# Patient Record
Sex: FEMALE | Race: WHITE | Employment: OTHER | URBAN - METROPOLITAN AREA
[De-identification: names, ages, dates, MRNs, and addresses within clinical notes are randomized per-mention and may not be internally consistent; named-entity substitution may affect disease eponyms.]

---

## 2018-08-23 ENCOUNTER — THERAPY VISIT (OUTPATIENT)
Dept: PHYSICAL THERAPY | Facility: CLINIC | Age: 48
End: 2018-08-23
Payer: COMMERCIAL

## 2018-08-23 DIAGNOSIS — M25.571 PAIN IN JOINT, ANKLE AND FOOT, RIGHT: Primary | ICD-10-CM

## 2018-08-23 PROCEDURE — 97110 THERAPEUTIC EXERCISES: CPT | Mod: GP | Performed by: PHYSICAL THERAPIST

## 2018-08-23 PROCEDURE — 97161 PT EVAL LOW COMPLEX 20 MIN: CPT | Mod: GP | Performed by: PHYSICAL THERAPIST

## 2018-08-23 PROCEDURE — 97112 NEUROMUSCULAR REEDUCATION: CPT | Mod: GP | Performed by: PHYSICAL THERAPIST

## 2018-08-23 NOTE — LETTER
[facility]  [address]  [facility] [id number] [phone]    2018    Re: Rubina Jordan   :   1970  MRN:  [id number]   REFERRING PHYSICIAN:   Angel Valdovinos    [facility]    DISCHARGE REPORT  Progress reporting period is from 2018      SUBJECTIVE  Subjective changes noted by patient:  Unknown as patient was only seen for her initial visit    Current pain level is NA.     Previous pain level was   Initial Pain level: 2/10.   Changes in function:  unknown  Adverse reaction to treatment or activity: None    OBJECTIVE  Changes noted in objective findings:  Patient has failed to return to therapy so current objective findings are unknown.     ASSESSMENT/PLAN  Updated problem list and treatment plan: Diagnosis 1:  Right ankle pain  Pain -  self management, education and home program  Decreased strength - therapeutic exercise, therapeutic activities and home program  Decreased proprioception - neuro re-education, therapeutic activities and home program  Decreased function - therapeutic activities and home program  STG/LTGs have been met or progress has been made towards goals:  unknown  Assessment of Progress: The patient's condition has potential to improve.  The patient has not returned to therapy. Current status is unknown.  Self Management Plans:  Patient has been instructed in a home treatment program.    Rubina continues to require the following intervention to meet STG and LTG's:  Patient needs to continue to work on the home exercise program.    Recommendations:  Will discharge since patient has not returned.    Thank you for your referral.    INQUIRIES  Therapist: Brinda Drake PT, [facility]  [address]  [place] [facility] [address]  Phone: [phone]  Fax: [phone]

## 2018-08-23 NOTE — MR AVS SNAPSHOT
"              After Visit Summary   2018    Rubina Jordan    MRN: [id number]           Patient Information     Date Of Birth          1970        Visit Information        Provider Department      2018 5:50 PM Brinda Drake PT [facility] Physical Therapy        Today's Diagnoses     Pain in joint, ankle and foot, right    -  1       Follow-ups after your visit        Who to contact     If you have questions or need follow up information about today's clinic visit or your schedule please contact [facility] PHYSICAL [facility] directly at [phone].  Normal or non-critical lab and imaging results will be communicated to you by [employer], letter or phone within 4 business days after the clinic has received the results. If you do not hear from us within 7 days, please contact the clinic through [employer] or phone. If you have a critical or abnormal lab result, we will notify you by phone as soon as possible.  Submit refill requests through [employer] or call your pharmacy and they will forward the refill request to us. Please allow 3 business days for your refill to be completed.          Additional Information About Your Visit        MyChart Information     [facility] [facility] lets you send messages to your doctor, view your test results, renew your prescriptions, schedule appointments and more. To sign up, go to [email or web address] . Click on \"Log in\" on the left side of the screen, which will take you to the Welcome page. Then click on \"Sign up Now\" on the right side of the page.     You will be asked to enter the access code listed below, as well as some personal information. Please follow the directions to create your username and password.     Your access code is: [email or web address]  Expires: 2018  5:44 PM     Your access code will  in 90 days. If you need help or a new code, please call your [place] clinic or [phone].        Care " EveryWhere ID     This is your Care EveryWhere ID. This could be used by other organizations to access your [place] medical records  [id number]         Blood Pressure from Last 3 Encounters:   No data found for BP    Weight from Last 3 Encounters:   No data found for Wt              We Performed the Following     HC PT EVAL, LOW COMPLEXITY     [facility] INITIAL EVAL REPORT     NEUROMUSCULAR RE-EDUCATION     THERAPEUTIC EXERCISES        Primary Care Provider Office Phone # Fax #    Angel Valdovinos [phone]       [place] [address]        Equal Access to Services     [facility]: Hadii aad ku hadasho Soomaali, waaxda luqadaha, qaybta kaalmada adeegyada, waxay idiin hayaan adepriscilla garg . So [facility] [phone].    ATENCIÓN: Si habla español, tiene a lackey disposición servicios gratuitos de asistencia lingüística. Ll[facility] [phone].    We comply with applicable federal civil rights laws and [place] laws. We do not discriminate on the basis of race, color, national origin, age, disability, sex, sexual orientation, or gender identity.            Thank you!     Thank you for choosing [facility] PH[facility]ICAL THERAP[facility]  for your care. Our goal is always to provide you with excellent care. Hearing back from our patients is one way we can continue to improve our services. Please take a few minutes to complete the written survey that you may receive in the mail after your visit with us. Thank you!             Your Updated Medication List - Protect others around you: Learn how to safely use, store and throw away your medicines at www.disposemymeds.org.      Notice  As of 8/23/2018  6:50 PM    You have not been prescribed any medications.

## 2018-08-23 NOTE — LETTER
[facility]  [address]  [facility] [id number] [phone]    2018    Re: Rubina Jordan   :   1970  MRN:  [id number]   REFERRING PHYSICIAN:   Angel Valdovinos    [facility]    Date of Initial Evaluation:  2018  Visits:  Rxs Used: 1  Reason for Referral:  Pain in joint, ankle and foot, right    EVALUATION SUMMARY    [facility]  Subjective:  Patient is a 48 year old female presenting with rehab right ankle/foot hpi. The history is provided by the patient. No  was used.   Rubina Jordan is a 48 year old female with a right ankle condition.  Condition occurred with:  Running.  Condition occurred: in the community.  This is a new condition  Found out she got into the [employer] 10 mile race and decided she should try to run.  Had not been running up until this point.  Tweaked right ankle running down a hill one month ago.  Has had previous ankle sprains. Stopped running and rested with biking, kayaking, and other activities.  Then tried to run yesterday with her brace and went 1.5 blocks and had pain with this.  Gave entry to the race to her sister.  Would like to run again.  Wears heels a few times a week at work.  Also has some right shoulder pain and does weight training 2 times a week at a class.  Has to be mindful of what she is doing now.  .    Patient reports pain:  Lower leg.  Radiates to:  Ankle and lower leg.  Pain is described as aching and is intermittent and reported as 2/10.  Associated with: none. Pain is worse during the day.  Symptoms are exacerbated by nothing and relieved by rest.  Since onset symptoms are gradually improving.                        Red flags:  None as reported by patient.    Objective:  Gait:    Deviations:  General Deviations:  Toe out L and toe out R  Ankle/Foot Evaluation  ROM:  AROM is  normal.PROM is normal.  Strength:    Dorsiflexion:  Left: 5/5   Strong/pain free    Pain:   Right: 5/5   Strong/pain free  Pain:  Plantarflexion: Left: 5/5   Strong/pain free  Pain:   Right: 5/5  Strong/pain free  Pain:  Inversion:Left: 5/5  Strong/pain free  Pain:     Right: 5/5  Strong/pain free  Pain:  Eversion:Left: 5/5  Strong/pain free  Pain:  Right: 5/5  Strong/pain free  Pain:  Strength wnl ankle: limited pf with single leg toe raise on the left.  LIGAMENT TESTING:   Anterior Drawer (ATF) Right: pos  Varus Stress (Calc Fib) Right: pos  External Rotation (High Ankle) Right: pos  SPECIAL TESTS: normal  PALPATION:   Right ankle tenderness present at:   anterior talofibular ligament and anterior tibiofibular ligament  EDEMA: Edema ankle: slight, anterior ankle.  MOBILITY TESTING:   Talocrural Right: hypermobile  Subtalar Left: hypermobile    Subtalar Right: hypermobile  FUNCTIONAL TESTS:   Quad:  Bilateral Leg Squat: anterior ankle adriana   Control is mild loss of control  Proprioception:  Stork Balance Test: Left: 30 sec  Right: 30sec, difficult on Airex pad             Running Evaluation:  Shoe Wear:    Type: Saucony    Orthotics: superfeet only in running shoes    Assessment/Plan:    Patient is a 48 year old female with right side ankle complaints.    Patient has the following significant findings with corresponding treatment plan.                Diagnosis 1:  Right ankle pain  Pain -  manual therapy, splint/taping/bracing/orthotics, self management, education and home program  Decreased strength - therapeutic exercise, therapeutic activities and home program  Decreased proprioception - neuro re-education, therapeutic activities and home program  Inflammation - self management/home program  Decreased function - therapeutic activities and home program    Therapy Evaluation Codes:   1) History comprised of:   Personal factors that impact the plan of care:      Time since onset of symptoms.    Comorbidity factors  that impact the plan of care are:      None.     Medications impacting care: None.  2) Examination of Body Systems comprised of:   Body structures and functions that impact the plan of care:      Ankle.   Activity limitations that impact the plan of care are:      Running and Walking.  3) Clinical presentation characteristics are:   Stable/Uncomplicated.  4) Decision-Making    Low complexity using standardized patient assessment instrument and/or measureable assessment of functional outcome.  Cumulative Therapy Evaluation is: Low complexity.  Previous and current functional limitations:  (See Goal Flow Sheet for this information)    Short term and Long term goals: (See Goal Flow Sheet for this information)   Communication ability:  Patient appears to be able to clearly communicate and understand verbal and written communication and follow directions correctly.  Treatment Explanation - The following has been discussed with the patient:   RX ordered/plan of care  Anticipated outcomes  Possible risks and side effects  This patient would benefit from PT intervention to resume normal activities.   Rehab potential is excellent.  Frequency:  1 X week, once daily  Duration:  for 12 weeks  Discharge Plan:  Achieve all LTG.  Independent in home treatment program.  Reach maximal therapeutic benefit.      Thank you for your referral.    INQUIRIES  Therapist: Brinda Drake, PT    [facility]  [address] [place], [place] [address] [address]  [facility] [address]  Phone: [phone]  Fax: [phone]

## 2018-08-23 NOTE — PROGRESS NOTES
[facility] Initial Evaluation  Subjective:  Patient is a 48 year old female presenting with rehab right ankle/foot hpi. The history is provided by the patient. No  was used.   Rubina Jordan is a 48 year old female with a right ankle condition.  Condition occurred with:  Running.  Condition occurred: in the community.  This is a new condition  Found out she got into the [employer] 10 mile race and decided she should try to run.  Had not been running up until this point.  Tweaked right ankle running down a hill one month ago.  Has had previous ankle sprains. Stopped running and rested with biking, kayaking, and other activities.  Then tried to run yesterday with her brace and went 1.5 blocks and had pain with this.  Gave entry to the race to her sister.  Would like to run again.  Wears heels a few times a week at work.  Also has some right shoulder pain and does weight training 2 times a week at a class.  Has to be mindful of what she is doing now.  .    Patient reports pain:  Lower leg.  Radiates to:  Ankle and lower leg.  Pain is described as aching and is intermittent and reported as 2/10.  Associated with: none. Pain is worse during the day.  Symptoms are exacerbated by nothing and relieved by rest.  Since onset symptoms are gradually improving.                              Red flags:  None as reported by patient.                        Objective:    Gait:      Deviations:  General Deviations:  Toe out L and toe out R          Ankle/Foot Evaluation  ROM:  AROM is normal.PROM is normal.      Strength:    Dorsiflexion:  Left: 5/5   Strong/pain free    Pain:   Right: 5/5   Strong/pain free  Pain:  Plantarflexion: Left: 5/5   Strong/pain free  Pain:   Right: 5/5  Strong/pain free  Pain:  Inversion:Left: 5/5  Strong/pain free  Pain:     Right: 5/5  Strong/pain free  Pain:  Eversion:Left: 5/5  Strong/pain free  Pain:  Right: 5/5  Strong/pain free  Pain:              Strength wnl ankle:  limited pf with single leg toe raise on the left.  LIGAMENT TESTING:   Anterior Drawer (ATF) Right: pos    Varus Stress (Calc Fib) Right: pos      External Rotation (High Ankle) Right: pos  SPECIAL TESTS: normal    PALPATION:     Right ankle tenderness present at:   anterior talofibular ligament and anterior tibiofibular ligament  EDEMA: Edema ankle: slight, anterior ankle.          MOBILITY TESTING:       Talocrural Right: hypermobile  Subtalar Left: hypermobile    Subtalar Right: hypermobile      FUNCTIONAL TESTS:         Quad:      Bilateral Leg Squat: anterior ankle adriana   Control is mild loss of control    Proprioception:  Stork Balance Test: Left: 30 sec  Right: 30sec, difficult on Airex pad                                                                    Running Evaluation:  Shoe Wear:    Type: Saucony    Orthotics: superfeet only in running shoes                                  ROS    Assessment/Plan:    Patient is a 48 year old female with right side ankle complaints.    Patient has the following significant findings with corresponding treatment plan.                Diagnosis 1:  Right ankle pain  Pain -  manual therapy, splint/taping/bracing/orthotics, self management, education and home program  Decreased strength - therapeutic exercise, therapeutic activities and home program  Decreased proprioception - neuro re-education, therapeutic activities and home program  Inflammation - self management/home program  Decreased function - therapeutic activities and home program    Therapy Evaluation Codes:   1) History comprised of:   Personal factors that impact the plan of care:      Time since onset of symptoms.    Comorbidity factors that impact the plan of care are:      None.     Medications impacting care: None.  2) Examination of Body Systems comprised of:   Body structures and functions that impact the plan of care:      Ankle.   Activity limitations that impact the plan of care are:      Running and  Walking.  3) Clinical presentation characteristics are:   Stable/Uncomplicated.  4) Decision-Making    Low complexity using standardized patient assessment instrument and/or measureable assessment of functional outcome.  Cumulative Therapy Evaluation is: Low complexity.    Previous and current functional limitations:  (See Goal Flow Sheet for this information)    Short term and Long term goals: (See Goal Flow Sheet for this information)     Communication ability:  Patient appears to be able to clearly communicate and understand verbal and written communication and follow directions correctly.  Treatment Explanation - The following has been discussed with the patient:   RX ordered/plan of care  Anticipated outcomes  Possible risks and side effects  This patient would benefit from PT intervention to resume normal activities.   Rehab potential is excellent.    Frequency:  1 X week, once daily  Duration:  for 12 weeks  Discharge Plan:  Achieve all LTG.  Independent in home treatment program.  Reach maximal therapeutic benefit.    Please refer to the daily flowsheet for treatment today, total treatment time and time spent performing 1:1 timed codes.

## 2018-11-12 PROBLEM — M25.571 PAIN IN JOINT, ANKLE AND FOOT, RIGHT: Status: RESOLVED | Noted: 2018-08-23 | Resolved: 2018-11-12

## 2018-11-12 NOTE — PROGRESS NOTES
Subjective:  HPI                    Objective:  System    Physical Exam    General     ROS    Assessment/Plan:    DISCHARGE REPORT    Progress reporting period is from 8/23/2018      SUBJECTIVE  Subjective changes noted by patient:  Unknown as patient was only seen for her initial visit    Current pain level is NA  .     Previous pain level was   Initial Pain level: 2/10.   Changes in function:  unknown  Adverse reaction to treatment or activity: None    OBJECTIVE  Changes noted in objective findings:  Patient has failed to return to therapy so current objective findings are unknown.       ASSESSMENT/PLAN  Updated problem list and treatment plan: Diagnosis 1:  Right ankle pain  Pain -  self management, education and home program  Decreased strength - therapeutic exercise, therapeutic activities and home program  Decreased proprioception - neuro re-education, therapeutic activities and home program  Decreased function - therapeutic activities and home program  STG/LTGs have been met or progress has been made towards goals:  unknown  Assessment of Progress: The patient's condition has potential to improve.  The patient has not returned to therapy. Current status is unknown.  Self Management Plans:  Patient has been instructed in a home treatment program.    Rubina continues to require the following intervention to meet STG and LTG's:  Patient needs to continue to work on the home exercise program.      Recommendations:  Will discharge since patient has not returned.    Please refer to the daily flowsheet for treatment today, total treatment time and time spent performing 1:1 timed codes.

## 2019-10-03 ENCOUNTER — THERAPY VISIT (OUTPATIENT)
Dept: PHYSICAL THERAPY | Facility: CLINIC | Age: 49
End: 2019-10-03
Payer: COMMERCIAL

## 2019-10-03 DIAGNOSIS — S86.012A STRAIN OF LEFT ACHILLES TENDON: ICD-10-CM

## 2019-10-03 DIAGNOSIS — M79.672 PAIN OF LEFT HEEL: ICD-10-CM

## 2019-10-03 PROCEDURE — 97110 THERAPEUTIC EXERCISES: CPT | Mod: GP | Performed by: PHYSICAL THERAPIST

## 2019-10-03 PROCEDURE — 97161 PT EVAL LOW COMPLEX 20 MIN: CPT | Mod: GP | Performed by: PHYSICAL THERAPIST

## 2019-10-03 NOTE — PROGRESS NOTES
[facility] Initial Evaluation  Subjective:  The history is provided by the patient. No  was used.   Type of problem:  Left ankle   Condition occurred with:  Insidious onset. This is a new condition   Problem details: Training for [employer] 10 mile race.  Running 3 times a week.  Last year sprained right ankle so was not able to run 10 mile last year.  Had not run much over the summer.  Ramped up mileage and feels this may have contributed to pain in the left heel.  Pain at Achilles insertion.  Pain begins about mile 4-5.  Last 6 mile run developed pain at the musculotendinous junction of the left calf.  Compression sleeve has helped.  .   Site of Pain: heel and medial calf.  Associated symptoms:  Loss of strength and loss of motion/stiffness. Symptoms are exacerbated by running and relieved by NSAID's and rest (compression).    Where condition occurred: during recreation / sport.              Pain is described as aching and sharp and is intermittent. Pain is worse during the day. Since onset symptoms are gradually worsening. Imaging testing: none.        Barriers include:  None as reported by patient.  Red flags:  None as reported by patient.                      Objective:  System    Ankle/Foot Evaluation  ROM:  AROM is normal.      Strength:                        Gastroc/Soleus:Left:  5-/5   Strong/painful  Pain:+  Right: 5/5  Strong/pain free  Pain:  LIGAMENT TESTING: normal              SPECIAL TESTS: Special tests ankle: pain with compression over heel.    PALPATION: Palpation of ankle: no specific tenderness over Achilles insertion at heel, only  medial MT junction.    EDEMA: Edema ankle: mild, medial ankle.          MOBILITY TESTING: normal              FUNCTIONAL TESTS: not assessed                                                              General     ROS    Assessment/Plan:    Patient is a 49 year old female with left side ankle complaints.    Patient has the following  significant findings with corresponding treatment plan.                Diagnosis 1:  Left calf and heel pain  Pain -  manual therapy, splint/taping/bracing/orthotics, self management, education and home program  Decreased strength - therapeutic exercise, therapeutic activities and home program  Inflammation - self management/home program  Decreased function - therapeutic activities and home program    Therapy Evaluation Codes:   1) History comprised of:   Personal factors that impact the plan of care:      None.    Comorbidity factors that impact the plan of care are:      None.     Medications impacting care: None.  2) Examination of Body Systems comprised of:   Body structures and functions that impact the plan of care:      Ankle.   Activity limitations that impact the plan of care are:      Running and Sports.  3) Clinical presentation characteristics are:   Stable/Uncomplicated.  4) Decision-Making    Low complexity using standardized patient assessment instrument and/or measureable assessment of functional outcome.  Cumulative Therapy Evaluation is: Low complexity.    Previous and current functional limitations:  (See Goal Flow Sheet for this information)    Short term and Long term goals: (See Goal Flow Sheet for this information)     Communication ability:  Patient appears to be able to clearly communicate and understand verbal and written communication and follow directions correctly.  Treatment Explanation - The following has been discussed with the patient:   RX ordered/plan of care  Anticipated outcomes  Possible risks and side effects  This patient would benefit from PT intervention to resume normal activities.   Rehab potential is excellent.    Frequency:  1 X a month, once daily  Duration:  for 3 months  Discharge Plan:  Achieve all LTG.  Independent in home treatment program.  Reach maximal therapeutic benefit.    Please refer to the daily flowsheet for treatment today, total treatment time and time  spent performing 1:1 timed codes.

## 2019-10-03 NOTE — LETTER
[facility] ATHLETIC [facility] PHYSICAL [facility]  [address]  [facility] [address] [phone]    2019    Re: Rubina Jordan   :   1970  MRN:  [id number]   REFERRING PHYSICIAN:   Zoe Colvin    [facility] PH[facility]    Date of Initial Evaluation:  10/03/2019  Visits:  Rxs Used: 1  Reason for Referral:     Strain of left Achilles tendon  Pain of left heel    EVALUATION SUMMARY  [facility]  Subjective:  The history is provided by the patient. No  was used.   Type of problem:  Left ankle   Condition occurred with:  Insidious onset. This is a new condition   Problem details: Training for [employer] 10 mile race.  Running 3 times a week.  Last year sprained right ankle so was not able to run 10 mile last year.  Had not run much over the summer.  Ramped up mileage and feels this may have contributed to pain in the left heel.  Pain at Achilles insertion.  Pain begins about mile 4-5.  Last 6 mile run developed pain at the musculotendinous junction of the left calf.  Compression sleeve has helped.  .   Site of Pain: heel and medial calf.  Associated symptoms:  Loss of strength and loss of motion/stiffness. Symptoms are exacerbated by running and relieved by NSAID's and rest (compression).  Where condition occurred: during recreation / sport.              Pain is described as aching and sharp and is intermittent. Pain is worse during the day. Since onset symptoms are gradually worsening. Imaging testing: none.         General health as reported by patient is good. Pertinent medical history includes:  High blood pressure.    Surgeries include:  None.  Current medications:  High blood pressure medication.   Primary job tasks include:  Computer work.           Patient is MKTG.   Barriers include:  None as reported by patient.  Red flags:  Severe headaches.         Objective:  System  Ankle/Foot Evaluation  ROM:  AROM is normal.  Strength:    Gastroc/Soleus:Left:  5-/5   Strong/painful  Pain:+  Right: 5/5  Strong/pain free  Pain:  LIGAMENT TESTING: normal  SPECIAL TESTS: Special tests ankle: pain with compression over heel.  PALPATION: Palpation of ankle: no specific tenderness over Achilles insertion at heel, only  medial MT junction.  EDEMA: Edema ankle: mild, medial ankle.  MOBILITY TESTING: normal  FUNCTIONAL TESTS: not assessed  Assessment/Plan:    Patient is a 49 year old female with left side ankle complaints.    Patient has the following significant findings with corresponding treatment plan.                Diagnosis 1:  Left calf and heel pain  Pain -  manual therapy, splint/taping/bracing/orthotics, self management, education and home program  Decreased strength - therapeutic exercise, therapeutic activities and home program  Inflammation - self management/home program  Decreased function - therapeutic activities and home program    Therapy Evaluation Codes:   1) History comprised of:   Personal factors that impact the plan of care:      None.    Comorbidity factors that impact the plan of care are:      None.     Medications impacting care: None.  2) Examination of Body Systems comprised of:   Body structures and functions that impact the plan of care:      Ankle.   Activity limitations that impact the plan of care are:      Running and Sports.  3) Clinical presentation characteristics are:   Stable/Uncomplicated.  4) Decision-Making    Low complexity using standardized patient assessment instrument and/or measureable assessment of functional outcome.  Cumulative Therapy Evaluation is: Low complexity.  Previous and current functional limitations:  (See Goal Flow Sheet for this information)    Short term and Long term goals: (See Goal Flow Sheet for this information)   Communication ability:  Patient appears to be able to clearly communicate and understand verbal  and written communication and follow directions correctly.  Treatment Explanation - The following has been discussed with the patient:   RX ordered/plan of care  Anticipated outcomes  Possible risks and side effects  This patient would benefit from PT intervention to resume normal activities.   Rehab potential is excellent.    Frequency:  1 X a month, once daily  Duration:  for 3 months  Discharge Plan:  Achieve all LTG.  Independent in home treatment program.  Reach maximal therapeutic benefit.    Thank you for your referral.    INQUIRIES  Therapist:Brinda Drake PT, [facility]  [facility] PHYSICAL THERAPY  [address] [place] [address] [address]  [facility] [address]  Phone: [phone]  Fax: [phone]

## 2019-11-06 PROBLEM — M79.672 PAIN OF LEFT HEEL: Status: RESOLVED | Noted: 2019-10-03 | Resolved: 2019-11-06

## 2019-11-06 PROBLEM — S86.012A STRAIN OF LEFT ACHILLES TENDON: Status: RESOLVED | Noted: 2019-10-03 | Resolved: 2019-11-06

## 2019-12-11 ENCOUNTER — THERAPY VISIT (OUTPATIENT)
Dept: PHYSICAL THERAPY | Facility: CLINIC | Age: 49
End: 2019-12-11
Payer: COMMERCIAL

## 2019-12-11 DIAGNOSIS — S92.211A CLOSED RIGHT CUBOID FRACTURE: ICD-10-CM

## 2019-12-11 DIAGNOSIS — S93.401A RIGHT ANKLE SPRAIN: ICD-10-CM

## 2019-12-11 PROCEDURE — 97110 THERAPEUTIC EXERCISES: CPT | Mod: GP | Performed by: PHYSICAL THERAPIST

## 2019-12-11 PROCEDURE — 97140 MANUAL THERAPY 1/> REGIONS: CPT | Mod: GP | Performed by: PHYSICAL THERAPIST

## 2019-12-11 PROCEDURE — 97161 PT EVAL LOW COMPLEX 20 MIN: CPT | Mod: GP | Performed by: PHYSICAL THERAPIST

## 2019-12-11 NOTE — PROGRESS NOTES
[facility] Initial Evaluation  Subjective:       Pertinent medical history includes:  High blood pressure, concussions/dizziness and numbness/tingling (Calf pain, swelling, warmth).      Current medications:  High blood pressure medication (Blood thinner Xeralto).   Primary job tasks include:  Computer work and prolonged sitting.           Occupation: Marketing.                           Objective:  System    Physical Exam    General     ROS    Assessment/Plan:

## 2019-12-11 NOTE — PROGRESS NOTES
[facility] Initial Evaluation  Subjective:  The history is provided by the patient. No  was used.   Type of problem:  Right ankle   Condition occurred with:  A twist. This is a new condition   Problem details: Tripped over a door stop and felt a crunch and sudden pain in the right ankle and foot. This occurred on 10/25/2019.  Was at a resort in [place] at the time. Could not walk on the ankle.  Had to have wheelchair assist and traveled for 16 hours. Had x-ray and showed cuboid and metatarsal fracture. Was non weight bearing for 2 weeks and was in a boot. Had onset of extreme calf pain.  Ended up with two DVTs, in the calf and thigh and multile pulmonary embolism and was hospitalized.  Still on blood thinners now for 3-6 months.  Still has tenderness in the calf.  No restrictions now except for risk of falling.  Can return to weight lifting and running.  Currently has tenderness in the foot.  Has not had any follow up x-rays.  Will have follow up ultrasound for clot.  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  Has been able to wear running shoes.  Has a [employer] membership.  Has worked with a  there. .   Patient reports pain:  Lateral. Radiates to:  Ankle. Associated symptoms:  Loss of motion/stiffness and loss of strength. Symptoms are exacerbated by walking and relieved by nothing.                              Barriers include:  Stairs.  Red flags:  None as reported by patient.                      Objective:    Gait:    Gait Type:  Antalgic     Deviations:  Ankle:  Push off decr R          Ankle/Foot Evaluation  ROM:    AROM:    Dorsiflexion:  Left:   10  Right:   10  Plantarflexion:  Left:  35    Right:  30          PROM:    Dorsiflexion:  Left:    20     Right:   15 with anterior ankle pain                 Strength:    Dorsiflexion:  Left: 5/5   Strong/pain free    Pain:   Right: 5-/5   Strong/pain free  Pain:  Plantarflexion:  Left: 5/5   Strong/pain free  Pain:   Right:  3-/5  Strong/painful  Pain:+  Inversion:Left: 5/5  Strong/pain free  Pain:     Right: 5-/5  Strong/pain free  Pain:  Eversion:Left: 5/5  Strong/pain free  Pain:  Right:  4+/5  Strong/painful  Pain:+                  LIGAMENT TESTING:   Anterior Drawer (ATF) Right: Gr III    Varus Stress (Calc Fib) Right: pos      External Rotation (High Ankle) Right: pos  SPECIAL TESTS: not assessed    PALPATION: Palpation of ankle: right cuboid.    Right ankle tenderness present at:   anterior talofibular ligament and anterior tibiofibular ligament  EDEMA:     Right ankle edema present at:  lateral        MOBILITY TESTING:       Talocrural Right: hypomobile    Midtarsal Right: normal  First Ray Right: normal  FUNCTIONAL TESTS:         Quad:      Bilateral Leg Squat:  Control is mild loss of control    Proprioception:  Qinging Weekly Flower Deliveryk Balance Test: Left: 30  Right: 10 with pain                                                      General     ROS    Assessment/Plan:    Patient is a 49 year old female with right side ankle complaints.    Patient has the following significant findings with corresponding treatment plan.                Diagnosis 1:  Right foot fracture  Pain -  hot/cold therapy, manual therapy, splint/taping/bracing/orthotics, self management and home program  Decreased ROM/flexibility - manual therapy, therapeutic exercise and home program  Decreased strength - therapeutic exercise, therapeutic activities and home program  Impaired balance - neuro re-education, therapeutic activities and home program  Inflammation - self management/home program  Impaired gait - gait training and home program  Decreased function - therapeutic activities and home program    Therapy Evaluation Codes:   1) History comprised of:   Personal factors that impact the plan of care:      None.    Comorbidity factors that impact the plan of care are:      None.     Medications impacting care:  None.  2) Examination of Body Systems comprised of:   Body structures and functions that impact the plan of care:      Ankle and foot.   Activity limitations that impact the plan of care are:      Driving, Running, Sports, Squatting/kneeling, Stairs, Standing and Walking.  3) Clinical presentation characteristics are:   Stable/Uncomplicated.  4) Decision-Making    Low complexity using standardized patient assessment instrument and/or measureable assessment of functional outcome.  Cumulative Therapy Evaluation is: Low complexity.    Previous and current functional limitations:  (See Goal Flow Sheet for this information)    Short term and Long term goals: (See Goal Flow Sheet for this information)     Communication ability:  Patient appears to be able to clearly communicate and understand verbal and written communication and follow directions correctly.  Treatment Explanation - The following has been discussed with the patient:   RX ordered/plan of care  Anticipated outcomes  Possible risks and side effects  This patient would benefit from PT intervention to resume normal activities.   Rehab potential is excellent.    Frequency:  1-2 X week, once daily  Duration:  for 6 weeks  Discharge Plan:  Achieve all LTG.  Independent in home treatment program.  Reach maximal therapeutic benefit.    Please refer to the daily flowsheet for treatment today, total treatment time and time spent performing 1:1 timed codes.

## 2019-12-11 NOTE — LETTER
[facility] ATHLETIC [facility] PHYSICAL [facility]  [address]  [facility] [address] [phone]    2019    Re: Rubina Jordan   :   1970  MRN:  [id number]   REFERRING PHYSICIAN:   Chester Gonsales    [facility] PH[facility]    Date of Initial Evaluation:  19  Visits:  Rxs Used: 1  Reason for Referral:     Closed right cuboid fracture  Right ankle sprain    EVALUATION SUMMARY    [facility]  Subjective:  The history is provided by the patient. No  was used. Type of problem:  Right ankle. Condition occurred with:  A twist. This is a new condition   Problem details: Tripped over a door stop and felt a crunch and sudden pain in the right ankle and foot. This occurred on 10/25/2019.  Was at a resort in [place] at the time. Could not walk on the ankle.  Had to have wheelchair assist and traveled for 16 hours. Had x-ray and showed cuboid and metatarsal fracture. Was non weight bearing for 2 weeks and was in a boot. Had onset of extreme calf pain.  Ended up with two DVTs, in the calf and thigh and multile pulmonary embolism and was hospitalized.  Still on blood thinners now for 3-6 months.  Still has tenderness in the calf.  No restrictions now except for risk of falling.  Can return to weight lifting and running. Currently has tenderness in the foot.  Has not had any follow up x-rays.  Will have follow up ultrasound for clot.  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  Has been able to wear running shoes.  Has a [employer] membership.  Has worked with a  there.  Patient reports pain:  Lateral. Radiates to:  Ankle. Associated symptoms:  Loss of motion/stiffness and loss of strength. Symptoms are exacerbated by walking and relieved by nothing.Pertinent medical history includes:  High blood pressure, concussions/dizziness and  numbness/tingling (Calf pain, swelling, warmth). Current medications:  High blood pressure medication (Blood thinner Xeralto).   Primary job tasks include:  Computer work and prolonged sitting. Occupation: Marketing.                             Barriers include:  Stairs.  Red flags:  None as reported by patient.                Objective:  Gait:    Gait Type:  Antalgic     Deviations:  Ankle:  Push off decr R  Ankle/Foot Evaluation  ROM:    AROM:    Dorsiflexion:  Left:   10  Right:   10  Plantarflexion:  Left:  35    Right:  30  Re: Rubina Jordan   :   1970    PROM:    Dorsiflexion:  Left:    20     Right:   15 with anterior ankle pain   Strength:    Dorsiflexion:  Left: 5/5   Strong/pain free    Pain:   Right: 5-/5   Strong/pain free  Pain:  Plantarflexion: Left: 5/5   Strong/pain free  Pain:   Right:  3-/5  Strong/painful  Pain:+  Inversion:Left: 5/5  Strong/pain free  Pain:     Right: 5-/5  Strong/pain free  Pain:  Eversion:Left: 5/5  Strong/pain free  Pain:  Right:  4+/5  Strong/painful  Pain:+  LIGAMENT TESTING:   Anterior Drawer (ATF) Right: Gr III  Varus Stress (Calc Fib) Right: pos  External Rotation (High Ankle) Right: pos  SPECIAL TESTS: not assessed  PALPATION: Palpation of ankle: right cuboid.  Right ankle tenderness present at:   anterior talofibular ligament and anterior tibiofibular ligament  EDEMA:   Right ankle edema present at:  lateral     MOBILITY TESTING:   Talocrural Right: hypomobile  Midtarsal Right: normal  First Ray Right: normal  FUNCTIONAL TESTS:   Quad:  Bilateral Leg Squat:  Control is mild loss of control  Proprioception:  Stork Balance Test: Left: 30  Right: 10 with pain    General   ROS    Assessment/Plan:    Patient is a 49 year old female with right side ankle complaints.    Patient has the following significant findings with corresponding treatment plan.                Diagnosis 1:  Right foot fracture  Pain -  hot/cold therapy, manual therapy,  splint/taping/bracing/orthotics, self management and home program  Decreased ROM/flexibility - manual therapy, therapeutic exercise and home program  Decreased strength - therapeutic exercise, therapeutic activities and home program  Impaired balance - neuro re-education, therapeutic activities and home program  Inflammation - self management/home program  Impaired gait - gait training and home program  Decreased function - therapeutic activities and home program    Therapy Evaluation Codes:   1) History comprised of:   Personal factors that impact the plan of care:      None.    Comorbidity factors that impact the plan of care are:      None.     Medications impacting care: None.        Re: Rubina Prasaduliffe   :   1970    2) Examination of Body Systems comprised of:   Body structures and functions that impact the plan of care:      Ankle and foot.   Activity limitations that impact the plan of care are:      Driving, Running, Sports, Squatting/kneeling, Stairs, Standing and                       Walking.  3) Clinical presentation characteristics are:   Stable/Uncomplicated.  4) Decision-Making    Low complexity using standardized patient assessment instrument and/or measureable assessment of functional outcome.  Cumulative Therapy Evaluation is: Low complexity.    Communication ability:  Patient appears to be able to clearly communicate and understand verbal and written communication and follow directions correctly.  Treatment Explanation - The following has been discussed with the patient:   RX ordered/plan of care  Anticipated outcomes  Possible risks and side effects  This patient would benefit from PT intervention to resume normal activities.   Rehab potential is excellent.  Frequency:  1-2 X week, once daily  Duration:  for 6 weeks  Discharge Plan:  Achieve all LTG.  Independent in home treatment program.  Reach maximal therapeutic benefit.    Thank you for your referral.    INQUIRIES  Therapist: Brinda  Noelle PT, [facility]   [facility] PHYSICAL THERAPY  [address]  [facility] [address]  Phone: [phone]  Fax: [phone]

## 2019-12-19 ENCOUNTER — THERAPY VISIT (OUTPATIENT)
Dept: PHYSICAL THERAPY | Facility: CLINIC | Age: 49
End: 2019-12-19
Payer: COMMERCIAL

## 2019-12-19 DIAGNOSIS — S93.401A RIGHT ANKLE SPRAIN: ICD-10-CM

## 2019-12-19 DIAGNOSIS — S92.211A CLOSED RIGHT CUBOID FRACTURE: ICD-10-CM

## 2019-12-19 PROCEDURE — 97140 MANUAL THERAPY 1/> REGIONS: CPT | Mod: GP | Performed by: PHYSICAL THERAPIST

## 2019-12-19 PROCEDURE — 97112 NEUROMUSCULAR REEDUCATION: CPT | Mod: GP | Performed by: PHYSICAL THERAPIST

## 2019-12-19 PROCEDURE — 97110 THERAPEUTIC EXERCISES: CPT | Mod: GP | Performed by: PHYSICAL THERAPIST

## 2019-12-27 ENCOUNTER — THERAPY VISIT (OUTPATIENT)
Dept: PHYSICAL THERAPY | Facility: CLINIC | Age: 49
End: 2019-12-27
Payer: COMMERCIAL

## 2019-12-27 DIAGNOSIS — S93.401A RIGHT ANKLE SPRAIN: ICD-10-CM

## 2019-12-27 DIAGNOSIS — S92.211A CLOSED RIGHT CUBOID FRACTURE: ICD-10-CM

## 2019-12-27 PROCEDURE — 97140 MANUAL THERAPY 1/> REGIONS: CPT | Mod: GP | Performed by: PHYSICAL THERAPIST

## 2019-12-27 PROCEDURE — 97110 THERAPEUTIC EXERCISES: CPT | Mod: GP | Performed by: PHYSICAL THERAPIST

## 2020-01-02 ENCOUNTER — THERAPY VISIT (OUTPATIENT)
Dept: PHYSICAL THERAPY | Facility: CLINIC | Age: 50
End: 2020-01-02
Payer: COMMERCIAL

## 2020-01-02 DIAGNOSIS — S93.401A RIGHT ANKLE SPRAIN: ICD-10-CM

## 2020-01-02 DIAGNOSIS — S92.211A CLOSED RIGHT CUBOID FRACTURE: ICD-10-CM

## 2020-01-02 PROCEDURE — 97140 MANUAL THERAPY 1/> REGIONS: CPT | Mod: GP | Performed by: PHYSICAL THERAPIST

## 2020-01-02 PROCEDURE — 97110 THERAPEUTIC EXERCISES: CPT | Mod: GP | Performed by: PHYSICAL THERAPIST

## 2020-01-02 PROCEDURE — 97112 NEUROMUSCULAR REEDUCATION: CPT | Mod: GP | Performed by: PHYSICAL THERAPIST

## 2020-01-02 NOTE — LETTER
[facility] ATHLETIC [facility]  [address]  [facility] [address] [phone]    2020    Re: Rubina Jordan   :   1970  MRN:  [id number]   REFERRING PHYSICIAN:   Chester Gonsales    [facility]    Date of Initial Evaluation:  19  Visits:  Rxs Used: 5  Reason for Referral:     Closed right cuboid fracture  Right ankle sprain    DISCHARGE REPORT  Progress reporting period is from 2019 to 2020.       SUBJECTIVE  Subjective changes noted by patient:  Subjective: Sore after walking around on tile and hard wood for several hours.  Otherwise notes improvement.  Has been going to the gym.  Going to a class with   tonight.  No sharp pain in the knee.    Current pain level is NA       Previous pain level was  NA     Changes in function:  Yes  Adverse reaction to treatment or activity: None    OBJECTIVE  Changes noted in objective findings:  Yes   Objective: Mild soreness over the posterior ankle, fibularis longus.  Normal gait barefoot. Still has some difficulty with balancing on the right with eyes closed.       ASSESSMENT/PLAN  Updated problem list and treatment plan: Diagnosis 1:  Right ankle sprain  Pain -  manual therapy, self management, education and home program  Decreased strength - therapeutic exercise, therapeutic activities and home program  Inflammation - self management/home program  Decreased function - therapeutic activities and home program  Instability -  Therapeutic Activity  Therapeutic Exercise  home program  STG/LTGs have been met or progress has been made towards goals:  Yes (See Goal flow sheet completed today.)  Assessment of Progress: The patient's condition is improving.  The patient's condition has potential to improve.  Self Management Plans:  Patient has been instructed in a home treatment program.    Rubina continues to require the following intervention to meet  STG and LTG's:  Patient needs to continue to work on the home exercise program.  Re: Rubina Jordan   :   1970    Recommendations:  This patient is ready to be discharged from therapy and continue their home treatment program.    Thank you for your referral.    INQUIRIES  Therapist: Brinda Drake PT, [facility]   [facility] PHYSICAL THERAPY  [address] [place] [address] [address]  [facility] [address]  Phone: [phone]  Fax: [phone]

## 2020-02-12 PROBLEM — S93.401A RIGHT ANKLE SPRAIN: Status: RESOLVED | Noted: 2019-12-11 | Resolved: 2020-02-12

## 2020-02-12 PROBLEM — S92.211A CLOSED RIGHT CUBOID FRACTURE: Status: RESOLVED | Noted: 2019-12-11 | Resolved: 2020-02-12

## 2020-02-12 NOTE — PROGRESS NOTES
Subjective:  HPI  Physical Exam                    Objective:  System    Physical Exam    General     ROS    Assessment/Plan:    DISCHARGE REPORT    Progress reporting period is from 12/11/2019 to 1/2/2020.       SUBJECTIVE  Subjective changes noted by patient:  .  Subjective: Sore after walking around on tile and hard wood for several hours.  Otherwise notes improvement.  Has been going to the gym.  Going to a class with   tonight.  No sharp pain in the knee.    Current pain level is NA  .     Previous pain level was  NA  .   Changes in function:  Yes (See Goal flowsheet attached for changes in current functional level)  Adverse reaction to treatment or activity: None    OBJECTIVE  Changes noted in objective findings:  Yes,   Objective: Mild soreness over the posterior ankle, fibularis longus.  Normal gait barefoot.  Still has some difficulty with balancing on the right with eyes closed.       ASSESSMENT/PLAN  Updated problem list and treatment plan: Diagnosis 1:  Right ankle sprain  Pain -  manual therapy, self management, education and home program  Decreased strength - therapeutic exercise, therapeutic activities and home program  Inflammation - self management/home program  Decreased function - therapeutic activities and home program  Instability -  Therapeutic Activity  Therapeutic Exercise  home program  STG/LTGs have been met or progress has been made towards goals:  Yes (See Goal flow sheet completed today.)  Assessment of Progress: The patient's condition is improving.  The patient's condition has potential to improve.  Self Management Plans:  Patient has been instructed in a home treatment program.    Rubina continues to require the following intervention to meet STG and LTG's:  Patient needs to continue to work on the home exercise program.      Recommendations:  This patient is ready to be discharged from therapy and continue their home treatment program.    Please refer to the daily flowsheet for  treatment today, total treatment time and time spent performing 1:1 timed codes.

## 2020-03-01 ENCOUNTER — HEALTH MAINTENANCE LETTER (OUTPATIENT)
Age: 50
End: 2020-03-01

## 2020-12-14 ENCOUNTER — HEALTH MAINTENANCE LETTER (OUTPATIENT)
Age: 50
End: 2020-12-14

## 2020-12-17 ENCOUNTER — THERAPY VISIT (OUTPATIENT)
Dept: PHYSICAL THERAPY | Facility: CLINIC | Age: 50
End: 2020-12-17
Payer: COMMERCIAL

## 2020-12-17 DIAGNOSIS — M79.662 PAIN IN LEFT LOWER LEG: ICD-10-CM

## 2020-12-17 PROCEDURE — 97110 THERAPEUTIC EXERCISES: CPT | Mod: GP | Performed by: PHYSICAL THERAPIST

## 2020-12-17 PROCEDURE — 97161 PT EVAL LOW COMPLEX 20 MIN: CPT | Mod: GP | Performed by: PHYSICAL THERAPIST

## 2020-12-17 NOTE — PROGRESS NOTES
[facility] Initial Evaluation  Subjective:  The history is provided by the patient. No  was used.   Patient Health History  Rubina Jordan being seen for L calf pain.     Date of Onset: July 2020.   HPI: Documentation: after running.   Pain is reported as 2/10 (4 after running or long drives) on pain scale.  General health as reported by patient is good.  Pertinent medical history includes: history of fractures, menopausal and migraines/headaches. Other medical history details: penicillan and sulfa.   Red flags:  None as reported by patient.     Surgeries: lumpectomy.    Current medications:  High blood pressure medication.    Occupation: office management.   Primary job tasks include:  Computer work and prolonged sitting.                  Therapist Generated HPI Evaluation  Problem details: L lateral superior calf pain after a run in July.  It hurt all the way up in to the ITB.  She has been using foam roller and balls and massage to ITB and calf and this has helped, but as soon as she runs, she feels pain after her runs.  She also feels it prolonged sitting and driving.  She does have hx of R cuboid and metatarsal fxs on 10/25/19 and she admits even though she was back to running, she wasn't 100%.  She still feels a slight pain at R 5th metatarsals.  She also points to lateral ilium on the R side which her massage therapist is working on.      Her runs are now 1-2x/wk 3-5 miles at a 10-11 min mile, where as her normal was 4x/wk, 3-5 miles at a 9.5 min mile  Her other exercise is to walk daily and some free weights to [employer] and occasional core..         Affected Side: L calf.    This is a new condition.  Condition occurred with:  Repetition/overuse.  Where condition occurred: during recreation/sport.  Site of Pain: L calf.  Pain is described as aching and is intermittent.  Pain is worse during the night.  Since onset symptoms are gradually improving.  Exacerbated by:  running, long driving, long walk or hiking in heavy boots.  Relieved by: massage.    Past treatment: massage. There was moderate improvement following previous treatment.  Restrictions due to condition include:  Working in normal job without restrictions.  Barriers include:  None as reported by patient.                        Objective:    Gait:  Decreased calf definition on the R    Gait Type:  Normal               Ankle/Foot Evaluation  ROM:  AROM is normal.      Strength:    Dorsiflexion:  Left: 5/5     Pain:   Right: 5/5   Pain:  Plantarflexion: Left: 5/5   Pain:   Right: 5-/5   Pain:-          Anterior Tibialis:Left: 5/5  Pain:  Right: 5/5  Pain:  Posterior Tibialis: Left: 5/5  Pain:  Right: 5/5  Pain:  Peroneals: Left: 4+/5   Pain:-  Right: 5/5  Pain:  Extensor Digitorum: Left: 5-/5   Pain:-Right: 5/5  Pain:  Strength wnl ankle: 20 SL calf raises on the L, 18 on the R.  L hip abd and ER 4+5 compared to 5/5 on the R.    SPECIAL TESTS: Special tests ankle: +ITB B.    PALPATION: Palpation of ankle: tender middle third of L peroneal.         FUNCTIONAL TESTS:       Core Strength:   Prone Plank: 60, but ant tilt and use of back muscles at 40 seconds/60 sec.    Quad:  Single Leg Squat Left: 45  Control is femoral IR.  Single Leg Squat Right: 45 Control is femoral IR  Bilateral Leg Squat: 80 slight shift to the L       Proprioception:  Stork Balance Test: Left: 30  Right: 30                                                     General     ROS    Assessment/Plan:    Patient is a 50 year old female with Left calf complaints.    Patient has the following significant findings with corresponding treatment plan.                Diagnosis 1:  L peroneal strain  Pain -  self management, education and home program  Decreased strength - therapeutic exercise, therapeutic activities and home program  Impaired muscle performance - neuro re-education and home program  Decreased function - therapeutic activities and home  program    Therapy Evaluation Codes:   1) History comprised of:   Personal factors that impact the plan of care:      None.    Comorbidity factors that impact the plan of care are:      None.     Medications impacting care: High blood pressure.  2) Examination of Body Systems comprised of:   Body structures and functions that impact the plan of care:      Ankle and Hip.   Activity limitations that impact the plan of care are:      Driving, Running and Sitting.  3) Clinical presentation characteristics are:   Evolving/Changing.  4) Decision-Making    Low complexity using standardized patient assessment instrument and/or measureable assessment of functional outcome.  Cumulative Therapy Evaluation is: Low complexity.    Previous and current functional limitations:  (See Goal Flow Sheet for this information)    Short term and Long term goals: (See Goal Flow Sheet for this information)     Communication ability:  Patient appears to be able to clearly communicate and understand verbal and written communication and follow directions correctly.  Treatment Explanation - The following has been discussed with the patient:   RX ordered/plan of care  Anticipated outcomes  Possible risks and side effects  This patient would benefit from PT intervention to resume normal activities.   Rehab potential is excellent.    Frequency:  2 X week, once daily  Duration:  for 2 weeks tapering to 1-2 X a month over 2 weeks  Discharge Plan:  Achieve all LTG.  Independent in home treatment program.  Reach maximal therapeutic benefit.    Please refer to the daily flowsheet for treatment today, total treatment time and time spent performing 1:1 timed codes.

## 2020-12-22 ENCOUNTER — THERAPY VISIT (OUTPATIENT)
Dept: PHYSICAL THERAPY | Facility: CLINIC | Age: 50
End: 2020-12-22
Payer: COMMERCIAL

## 2020-12-22 DIAGNOSIS — M79.662 PAIN IN LEFT LOWER LEG: ICD-10-CM

## 2020-12-22 PROCEDURE — 97530 THERAPEUTIC ACTIVITIES: CPT | Mod: GP | Performed by: PHYSICAL THERAPIST

## 2020-12-22 PROCEDURE — 97110 THERAPEUTIC EXERCISES: CPT | Mod: GP | Performed by: PHYSICAL THERAPIST

## 2020-12-31 ENCOUNTER — THERAPY VISIT (OUTPATIENT)
Dept: PHYSICAL THERAPY | Facility: CLINIC | Age: 50
End: 2020-12-31
Payer: COMMERCIAL

## 2020-12-31 DIAGNOSIS — M79.662 PAIN IN LEFT LOWER LEG: ICD-10-CM

## 2020-12-31 PROCEDURE — 97110 THERAPEUTIC EXERCISES: CPT | Mod: GP | Performed by: PHYSICAL THERAPIST

## 2020-12-31 PROCEDURE — 97140 MANUAL THERAPY 1/> REGIONS: CPT | Mod: GP | Performed by: PHYSICAL THERAPIST

## 2021-01-07 ENCOUNTER — THERAPY VISIT (OUTPATIENT)
Dept: PHYSICAL THERAPY | Facility: CLINIC | Age: 51
End: 2021-01-07
Payer: COMMERCIAL

## 2021-01-07 DIAGNOSIS — M79.662 PAIN IN LEFT LOWER LEG: ICD-10-CM

## 2021-01-07 PROCEDURE — 97140 MANUAL THERAPY 1/> REGIONS: CPT | Mod: GP | Performed by: PHYSICAL THERAPIST

## 2021-01-07 PROCEDURE — 97110 THERAPEUTIC EXERCISES: CPT | Mod: GP | Performed by: PHYSICAL THERAPIST

## 2021-01-12 ENCOUNTER — THERAPY VISIT (OUTPATIENT)
Dept: PHYSICAL THERAPY | Facility: CLINIC | Age: 51
End: 2021-01-12
Payer: COMMERCIAL

## 2021-01-12 DIAGNOSIS — M79.662 PAIN IN LEFT LOWER LEG: ICD-10-CM

## 2021-01-12 PROCEDURE — 97110 THERAPEUTIC EXERCISES: CPT | Mod: GP | Performed by: PHYSICAL THERAPIST

## 2021-01-12 PROCEDURE — 97140 MANUAL THERAPY 1/> REGIONS: CPT | Mod: GP | Performed by: PHYSICAL THERAPIST

## 2021-01-14 ENCOUNTER — THERAPY VISIT (OUTPATIENT)
Dept: PHYSICAL THERAPY | Facility: CLINIC | Age: 51
End: 2021-01-14
Payer: COMMERCIAL

## 2021-01-14 DIAGNOSIS — M79.662 PAIN IN LEFT LOWER LEG: ICD-10-CM

## 2021-01-14 PROCEDURE — 97140 MANUAL THERAPY 1/> REGIONS: CPT | Mod: GP | Performed by: PHYSICAL THERAPIST

## 2021-01-14 PROCEDURE — 97110 THERAPEUTIC EXERCISES: CPT | Mod: GP | Performed by: PHYSICAL THERAPIST

## 2021-01-19 ENCOUNTER — THERAPY VISIT (OUTPATIENT)
Dept: PHYSICAL THERAPY | Facility: CLINIC | Age: 51
End: 2021-01-19
Payer: COMMERCIAL

## 2021-01-19 DIAGNOSIS — M79.662 PAIN IN LEFT LOWER LEG: ICD-10-CM

## 2021-01-19 PROCEDURE — 97140 MANUAL THERAPY 1/> REGIONS: CPT | Mod: GP | Performed by: PHYSICAL THERAPIST

## 2021-01-19 PROCEDURE — 97110 THERAPEUTIC EXERCISES: CPT | Mod: GP | Performed by: PHYSICAL THERAPIST

## 2021-01-21 ENCOUNTER — THERAPY VISIT (OUTPATIENT)
Dept: PHYSICAL THERAPY | Facility: CLINIC | Age: 51
End: 2021-01-21
Payer: COMMERCIAL

## 2021-01-21 DIAGNOSIS — M79.662 PAIN IN LEFT LOWER LEG: ICD-10-CM

## 2021-01-21 PROCEDURE — 97140 MANUAL THERAPY 1/> REGIONS: CPT | Mod: GP | Performed by: PHYSICAL THERAPIST

## 2021-01-21 NOTE — PROGRESS NOTES
PROGRESS  REPORT    Progress reporting period is from 12/17/20/ to 1/21/21.       SUBJECTIVE  Subjective changes noted by patient: Due to the ice conditions outside and not having a treadmill, she hasn't been able to test any long walks or runs lately.   The last time she walked 3+ miles, she was pleasantly surprised and feels she is improving.   Current Pain level: (0-3/10).     Initial Pain level: (2-4/10).   Changes in function:  Yes (See Goal flowsheet attached for changes in current functional level)  Adverse reaction to treatment or activity: None    OBJECTIVE  Changes noted in objective findings:  Yes,     L peroneal and ext digitorum 5-/5L but not painful.    Increased tissue texture noted at small area at proximal L peroneal.    Able to complete 32 SL toe raises B.    B glut strength 5/5.     ASSESSMENT/PLAN  Updated problem list and treatment plan: Diagnosis 1:  L peroneal strain  Pain -  Manual, self management, education and home program  Decreased strength - therapeutic exercise, therapeutic activities and home program  Impaired muscle performance - neuro re-education and home program  Decreased function - therapeutic activities and home program  STG/LTGs have been met or progress has been made towards goals:  Yes (See Goal flow sheet completed today.)  Assessment of Progress: The patient's condition is improving.  Self Management Plans:  Patient has been instructed in a home treatment program.  Patient  has been instructed in self management of symptoms.  I have re-evaluated this patient and find that the nature, scope, duration and intensity of the therapy is appropriate for the medical condition of the patient.  Rubina continues to require the following intervention to meet STG and LTG's:  PT    Recommendations:  This patient would benefit from continued therapy.     Frequency:  2 X a month, once daily  Duration:  for 2 months        Please refer to the daily flowsheet for treatment today, total  treatment time and time spent performing 1:1 timed codes.

## 2021-01-21 NOTE — LETTER
[facility]  [address]  [place] [address] [phone]    2021    Re: Rubina Jordan   :   1970  MRN:  [id number]   REFERRING PHYSICIAN:   Zoe Colvin    [facility]    Date of Initial Evaluation:  2020  Visits:  Rxs Used: 8  Reason for Referral:  Pain in left lower leg    EVALUATION SUMMARY    PROGRESS  REPORT    Progress reporting period is from 20/ to 21.       SUBJECTIVE  Subjective changes noted by patient: Due to the ice conditions outside and not having a treadmill, she hasn't been able to test any long walks or runs lately.   The last time she walked 3+ miles, she was pleasantly surprised and feels she is improving.   Current Pain level: (0-3/10).     Initial Pain level: (2-4/10).   Changes in function:  Yes (See Goal flowsheet attached for changes in current functional level)  Adverse reaction to treatment or activity: None    OBJECTIVE  Changes noted in objective findings:  Yes,   L peroneal and ext digitorum 5-/5L but not painful.    Increased tissue texture noted at small area at proximal L peroneal.    Able to complete 32 SL toe raises B.    B glut strength 5/5.     ASSESSMENT/PLAN  Updated problem list and treatment plan: Diagnosis 1:  L peroneal strain  Pain -  Manual, self management, education and home program  Decreased strength - therapeutic exercise, therapeutic activities and home program  Impaired muscle performance - neuro re-education and home program  Decreased function - therapeutic activities and home program  STG/LTGs have been met or progress has been made towards goals:  Yes (See Goal flow sheet completed today.)  Assessment of Progress: The patient's condition is improving.  Self Management Plans:  Patient has been instructed in a home treatment program.  Patient  has been instructed in self management of symptoms.  I have  re-evaluated this patient and find that the nature, scope, duration and intensity of the therapy is appropriate for the medical condition of the patient.  Rubina continues to require the following intervention to meet STG and LTG's:  PT  Re: Rubina Jordan   :   1970    Recommendations:  This patient would benefit from continued therapy.     Frequency:  2 X a month, once daily  Duration:  for 2 months    Thank you for your referral.    INQUIRIES  Therapist: Ligia Peguero PT   [facility]  [address] [place] [address] [id number]  [facility] [address]  Phone: [phone]  Fax: [phone]

## 2021-02-09 ENCOUNTER — THERAPY VISIT (OUTPATIENT)
Dept: PHYSICAL THERAPY | Facility: CLINIC | Age: 51
End: 2021-02-09
Payer: COMMERCIAL

## 2021-02-09 DIAGNOSIS — M79.662 PAIN IN LEFT LOWER LEG: ICD-10-CM

## 2021-02-09 PROCEDURE — 97140 MANUAL THERAPY 1/> REGIONS: CPT | Mod: GP | Performed by: PHYSICAL THERAPIST

## 2021-02-09 NOTE — PROGRESS NOTES
Discharge Note    Progress reporting period is from initial evaluation date (please see noted date below) to Feb 9, 2021.  Linked Episodes   Type: Episode: Status: Noted: Resolved: Last update: Updated by:   PHYSICAL THERAPY Lleg12/17/20 Active 12/17/2020 2/9/2021 12:24 PM Ligia Peguero, PT      Comments:         Patient seen for 9 visits.    SUBJECTIVE  Subjective changes noted by patient:  Patient reports she ran 3 miles last week and felt no pain.   Maybe 1/10 the next day.  Is doing HEP consistently and likes the shark tool  Would like education how to use it on her R ankle due to old fractures and high ankle sprains.  .  Current pain level is (0-1/10).     Previous pain level was  (2-4/10).   Changes in function:  Yes (See Goal flowsheet attached for changes in current functional level)  Adverse reaction to treatment or activity: None    OBJECTIVE  Changes noted in objective findings: Increased tissue texture still noted at small spot at proximal L peroneal.  B ankle strength all 5/5 and able to complete 30 SL toe raises B.  B glut strength 5/5.      ASSESSMENT/PLAN  Diagnosis: L peroneal strain    Updated problem list and treatment plan:   Pain - HEP  Decreased strength - HEP  STG/LTGs have been met or progress has been made towards goals:  Yes, please see goal flowsheet for most current information  Assessment of Progress: current status is unknown.    Last current status:     Self Management Plans:  HEP  I have re-evaluated this patient and find that the nature, scope, duration and intensity of the therapy is appropriate for the medical condition of the patient.  Rubina continues to require the following intervention to meet STG and LTG's:  HEP.    Recommendations:  Discharge with current home program.  Patient to follow up with MD as needed.    Please refer to the daily flowsheet for treatment today, total treatment time and time spent performing 1:1 timed codes.

## 2021-04-18 ENCOUNTER — HEALTH MAINTENANCE LETTER (OUTPATIENT)
Age: 51
End: 2021-04-18

## 2021-10-02 ENCOUNTER — HEALTH MAINTENANCE LETTER (OUTPATIENT)
Age: 51
End: 2021-10-02

## 2022-01-22 ENCOUNTER — HEALTH MAINTENANCE LETTER (OUTPATIENT)
Age: 52
End: 2022-01-22

## 2022-05-14 ENCOUNTER — HEALTH MAINTENANCE LETTER (OUTPATIENT)
Age: 52
End: 2022-05-14

## 2022-09-03 ENCOUNTER — HEALTH MAINTENANCE LETTER (OUTPATIENT)
Age: 52
End: 2022-09-03

## 2023-04-29 ENCOUNTER — HEALTH MAINTENANCE LETTER (OUTPATIENT)
Age: 53
End: 2023-04-29

## 2023-06-03 ENCOUNTER — HEALTH MAINTENANCE LETTER (OUTPATIENT)
Age: 53
End: 2023-06-03

## 2024-10-07 ENCOUNTER — TRANSCRIBE ORDERS (OUTPATIENT)
Dept: OTHER | Age: 54
End: 2024-10-07

## 2024-10-07 DIAGNOSIS — M76.62 ACHILLES TENDINITIS OF LEFT LOWER EXTREMITY: Primary | ICD-10-CM

## 2025-05-11 ENCOUNTER — HEALTH MAINTENANCE LETTER (OUTPATIENT)
Age: 55
End: 2025-05-11

## 2025-06-04 ENCOUNTER — OFFICE VISIT (OUTPATIENT)
Dept: FAMILY MEDICINE | Facility: CLINIC | Age: 55
End: 2025-06-04
Payer: COMMERCIAL

## 2025-06-04 VITALS
DIASTOLIC BLOOD PRESSURE: 78 MMHG | TEMPERATURE: 97.5 F | SYSTOLIC BLOOD PRESSURE: 117 MMHG | RESPIRATION RATE: 12 BRPM | HEIGHT: 65 IN | HEART RATE: 68 BPM | OXYGEN SATURATION: 100 % | WEIGHT: 145 LBS | BODY MASS INDEX: 24.16 KG/M2

## 2025-06-04 DIAGNOSIS — M67.912 TENDINOPATHY OF LEFT ROTATOR CUFF: ICD-10-CM

## 2025-06-04 DIAGNOSIS — L60.3 BRITTLE NAILS: ICD-10-CM

## 2025-06-04 DIAGNOSIS — R09.82 POSTNASAL DRIP: Primary | ICD-10-CM

## 2025-06-04 PROCEDURE — 3074F SYST BP LT 130 MM HG: CPT | Performed by: FAMILY MEDICINE

## 2025-06-04 PROCEDURE — 3078F DIAST BP <80 MM HG: CPT | Performed by: FAMILY MEDICINE

## 2025-06-04 PROCEDURE — 99203 OFFICE O/P NEW LOW 30 MIN: CPT | Performed by: FAMILY MEDICINE

## 2025-06-04 PROCEDURE — 1126F AMNT PAIN NOTED NONE PRSNT: CPT | Performed by: FAMILY MEDICINE

## 2025-06-04 ASSESSMENT — PAIN SCALES - GENERAL: PAINLEVEL_OUTOF10: NO PAIN (0)

## 2025-06-04 NOTE — PROGRESS NOTES
Assessment & Plan     Rubina was seen today for establish care, nasal problem and toenail.    Diagnoses and all orders for this visit:    Postnasal drip  Persistent postnasal drainage.  Advised patient to start an over-the-counter Flonase.  If symptoms persisted I would recommend sinus x-rays to evaluate for chronic sinusitis or referral to ENT.    Tendinopathy of left rotator cuff  Patient is currently seeing Ortho and physical therapy for Achilles tendinitis of the left lower extremities and tendinopathy of the left rotator cuff.    Brittle nails  Reports brittle and easy to break nails.  Advised patient to start an over-the-counter biotin.      Health Maintenance Reviewed:  Advised patient to schedule a routine preventative wellness visit with fasting labs.      Subjective   Rubina is a 55 year old, presenting for the following health issues:  Establish Care, Nasal Problem, and Toenail        6/4/2025     3:07 PM   Additional Questions   Roomed by Serge BELL     Via the Health Maintenance questionnaire, the patient has reported the following services have been completed , this information has been sent to the abstraction team.  Toenail    History of Present Illness       Reason for visit:  New patient intake / care list   She is taking medications regularly.  Pleasant 55-year-old who presents to the clinic to establish care.  Medical history significant for tendinopathy of the left rotator cuff.  Patient is currently seeing physical therapy and Ortho.     Other minor complaints includes persistent postnasal drainage and pressure sensation in both ears.  Patient denies any hearing changes.    FDLMP: unknown.     IUD removed in 2022- amnenohrrhea since then .   Sleep:- struggling with occasional insomnia.       Review of Systems  Constitutional, neuro, ENT, endocrine, pulmonary, cardiac, gastrointestinal, genitourinary, musculoskeletal, integument and psychiatric systems are negative, except as otherwise noted.     "  Objective    /78   Pulse 68   Temp 97.5  F (36.4  C) (Temporal)   Resp 12   Ht 1.657 m (5' 5.25\")   Wt 65.8 kg (145 lb)   LMP  (LMP Unknown)   SpO2 100%   BMI 23.94 kg/m    Body mass index is 23.94 kg/m .  Physical Exam   GENERAL: alert and no distress  EYES: Eyes grossly normal to inspection, PERRL and conjunctivae and sclerae normal  HENT: ear canals and TM's normal, nose and mouth without ulcers or lesions  NECK: no adenopathy, no asymmetry, masses, or scars  RESP: lungs clear to auscultation - no rales, rhonchi or wheezes  CV: regular rate and rhythm, normal S1 S2, no S3 or S4, no murmur, click or rub, no peripheral edema  MS: no gross musculoskeletal defects noted, no edema  SKIN: no suspicious lesions or rashes  PSYCH: mentation appears normal, affect normal/bright    Results for orders placed or performed in visit on 10/14/22   Colonoscopy - HIM Scan     Status: None    Narrative    See encounter dated - 06/04/2025             Signed Electronically by: Mia Dodson MD    "

## 2025-06-11 ENCOUNTER — THERAPY VISIT (OUTPATIENT)
Age: 55
End: 2025-06-11
Payer: COMMERCIAL

## 2025-06-11 DIAGNOSIS — M25.512 LEFT SHOULDER PAIN, UNSPECIFIED CHRONICITY: ICD-10-CM

## 2025-06-11 DIAGNOSIS — M67.912 TENDINOPATHY OF LEFT ROTATOR CUFF: Primary | ICD-10-CM

## 2025-06-11 PROCEDURE — 97110 THERAPEUTIC EXERCISES: CPT | Mod: GP | Performed by: PHYSICAL THERAPIST

## 2025-06-11 PROCEDURE — 97161 PT EVAL LOW COMPLEX 20 MIN: CPT | Mod: GP | Performed by: PHYSICAL THERAPIST

## 2025-06-11 ASSESSMENT — ACTIVITIES OF DAILY LIVING (ADL)
STANDING FOR 15 MINUTES: NO DIFFICULTY AT ALL
HOS_ADL_HIGHEST_POTENTIAL_SCORE: 68
HOW_WOULD_YOU_RATE_YOUR_CURRENT_LEVEL_OF_FUNCTION_DURING_YOUR_SPORTS_RELATED_ACTIVITIES_FROM_0_TO_100_WITH_100_BEING_YOUR_LEVEL_OF_FUNCTION_PRIOR_TO_YOUR_HIP_PROBLEM_AND_0_BEING_THE_INABILITY_TO_PERFORM_ANY_OF_YOUR_USUAL_DAILY_ACTIVITIES?: 50
HOW_WOULD_YOU_RATE_YOUR_CURRENT_LEVEL_OF_FUNCTION_DURING_YOUR_USUAL_ACTIVITIES_OF_DAILY_LIVING_FROM_0_TO_100_WITH_100_BEING_YOUR_LEVEL_OF_FUNCTION_PRIOR_TO_YOUR_HIP_PROBLEM_AND_0_BEING_THE_INABILITY_TO_PERFORM_ANY_OF_YOUR_USUAL_DAILY_ACTIVITIES?: 75
CUTTING/LATERAL_MOVEMENTS: SLIGHT DIFFICULTY
DEEP_SQUATTING: SLIGHT DIFFICULTY
AT_ITS_WORST?: 3
LANDING: SLIGHT DIFFICULTY
CARRYING_A_HEAVY_OBJECT_OF_10_POUNDS: 2
LIGHT_TO_MODERATE_WORK: NO DIFFICULTY AT ALL
PUTTING ON SOCKS AND SHOES: NO DIFFICULTY AT ALL
HOS_ADL_SCORE(%): 79.41
STEPPING UP AND DOWN CURBS: NO DIFFICULTY AT ALL
GOING_UP_1_FLIGHT_OF_STAIRS: SLIGHT DIFFICULTY
DEEP SQUATTING: SLIGHT DIFFICULTY
PUTTING_ON_SOCKS_AND_SHOES: NO DIFFICULTY AT ALL
ADL_COUNT: 17
PUTTING_ON_YOUR_PANTS: 0
WALKING_15_MINUTES_OR_GREATER: MODERATE DIFFICULTY
SPORTS_COUNT: 9
GOING UP 1 FLIGHT OF STAIRS: SLIGHT DIFFICULTY
WALKING_INITIALLY: MODERATE DIFFICULTY
TWISTING/PIVOTING ON INVOLVED LEG: NO DIFFICULTY AT ALL
RUNNING_ONE_MILE: MODERATE DIFFICULTY
WALKING_APPROXIMATELY_10_MINUTES: SLIGHT DIFFICULTY
PLEASE_INDICATE_YOR_PRIMARY_REASON_FOR_REFERRAL_TO_THERAPY:: HIP
HOW_WOULD_YOU_RATE_YOUR_CURRENT_LEVEL_OF_FUNCTION_DURING_YOUR_USUAL_ACTIVITIES_OF_DAILY_LIVING_FROM_0_TO_100_WITH_100_BEING_YOUR_LEVEL_OF_FUNCTION_PRIOR_TO_YOUR_HIP_PROBLEM_AND_0_BEING_THE_INABILITY_TO_PERFORM_ANY_OF_YOUR_USUAL_DAILY_ACTIVITIES?: 75
WALKING_INITIALLY: MODERATE DIFFICULTY
WHEN_LYING_ON_THE_INVOLVED_SIDE: 4
LOW_IMPACT_ACTIVITIES_LIKE_FAST_WALKING: MODERATE DIFFICULTY
PUTTING_ON_AN_UNDERSHIRT_OR_A_PULLOVER_SWEATER: 4
REACHING_FOR_SOMETHING_ON_A_HIGH_SHELF: 4
SPORTS_TOTAL_ITEM_SCORE: 0
ADL_SCORE(%): 0
JUMPING: MODERATE DIFFICULTY
ADL_HIGHEST_POTENTIAL_SCORE: 68
REMOVING_SOMETHING_FROM_YOUR_BACK_POCKET: 1
STANDING_FOR_15_MINUTES: NO DIFFICULTY AT ALL
WALKING_FOR_APPROXIMATELY_10_MINUTES: SLIGHT DIFFICULTY
WASHING_YOUR_BACK: 3
WALKING_UP_STEEP_HILLS: SLIGHT DIFFICULTY
WALKING_UP_STEEP_HILLS: SLIGHT DIFFICULTY
LIGHT_TO_MODERATE_WORK: NO DIFFICULTY AT ALL
SPORTS_HIGHEST_POTENTIAL_SCORE: 36
TWISTING/PIVOTING_ON_INVOLVED_LEG: NO DIFFICULTY AT ALL
SITTING_FOR_15_MINUTES: SLIGHT DIFFICULTY
HOW_WOULD_YOU_RATE_YOUR_CURRENT_LEVEL_OF_FUNCTION?: NEARLY NORMAL
HOS_ADL_ITEM_SCORE_TOTAL: 54
PUSHING_WITH_THE_INVOLVED_ARM: 5
PUTTING_ON_A_SHIRT_THAT_BUTTONS_DOWN_THE_FRONT: 0
RECREATIONAL ACTIVITIES: MODERATE DIFFICULTY
RECREATIONAL_ACTIVITIES: MODERATE DIFFICULTY
ABILITY_TO_PERFORM_ACTIVITY_WITH_YOUR_NORMAL_TECHNIQUE: SLIGHT DIFFICULTY
SITTING FOR 15 MINUTES: SLIGHT DIFFICULTY
HEAVY_WORK: SLIGHT DIFFICULTY
GETTING_INTO_AND_OUT_OF_A_BATHTUB: SLIGHT DIFFICULTY
ABILITY_TO_PARTICIPATE_IN_YOUR_DESIRED_SPORT_AS_LONG_AS_YOU_WOULD_LIKE: MODERATE DIFFICULTY
ROLLING_OVER_IN_BED: NO DIFFICULTY AT ALL
SPORTS_SCORE(%): 0
TOUCHING_THE_BACK_OF_YOUR_NECK: 1
ADL_TOTAL_ITEM_SCORE: 0
PLACING_AN_OBJECT_ON_A_HIGH_SHELF: 1
WASHING_YOUR_HAIR?: 1
GOING DOWN 1 FLIGHT OF STAIRS: SLIGHT DIFFICULTY
WALKING_15_MINUTES_OR_GREATER: MODERATE DIFFICULTY
ROLLING OVER IN BED: NO DIFFICULTY AT ALL
GETTING_INTO_AND_OUT_OF_AN_AVERAGE_CAR: NO DIFFICULTY AT ALL
STARTING_AND_STOPPING_QUICKLY: SLIGHT DIFFICULTY
GETTING INTO AND OUT OF AN AVERAGE CAR: NO DIFFICULTY AT ALL
STEPPING_UP_AND_DOWN_CURBS: NO DIFFICULTY AT ALL
WALKING_DOWN_STEEP_HILLS: SLIGHT DIFFICULTY
GETTING_INTO_AND_OUT_OF_A_BATHTUB: SLIGHT DIFFICULTY
PLEASE_INDICATE_YOR_PRIMARY_REASON_FOR_REFERRAL_TO_THERAPY:: SHOULDER
WALKING_DOWN_STEEP_HILLS: SLIGHT DIFFICULTY
HEAVY_WORK: SLIGHT DIFFICULTY
GOING_DOWN_1_FLIGHT_OF_STAIRS: SLIGHT DIFFICULTY

## 2025-06-11 NOTE — PROGRESS NOTES
PHYSICAL THERAPY EVALUATION  Type of Visit: Evaluation       Fall Risk Screen:  Have you fallen 2 or more times in the past year?: No  Have you fallen and had an injury in the past year?: No    Subjective         Presenting condition or subjective complaint: rotator cuff and left hip  Date of onset:      Relevant medical history: Arthritis; Dizziness; Menopause   Dates & types of surgery:      Prior diagnostic imaging/testing results: X-ray     Prior therapy history for the same diagnosis, illness or injury: No      Prior Level of Function  Transfers:   Ambulation:   ADL:   IADL:     Living Environment  Social support: With a significant other or spouse   Type of home: House; 2-story   Stairs to enter the home: Yes 3 Is there a railing: No     Ramp: No   Stairs inside the home: Yes 20 Is there a railing: Yes     Help at home: None  Equipment owned:       Employment: Yes office  Hobbies/Interests: running, hiking, cycling, knitting    Patient goals for therapy: return to running nd weightlifting; be ready for ba kpaing trip in august    Left shoulder pain with lifting weights.  Hurts all the time.  Left hip pain with return to running 6 weeks ago.  Had to do run/walk.  Was walking.  Worked with a  at the [employer].  Doing some at home and some at the gym.  Doing some free weights and some machines. Has not tried running.  Back packing trip planned [place] for 8 days.  Has done 6 miles with #16.  Mostly 6-10 mile hikes.  Has history of left Achilles.  Difficult reaching behind back, taking off sports bra.  Right handed.Pain in the hip with prolonged sitting.      Pain assessment: See objective evaluation for additional pain details     Objective   HIP EVALUATION  PAIN: Pain Location: left posterior shoulder and left posterior hip  Pain Frequency: intermittent  Pain is Exacerbated By: reaching out to the side and overhead for the shoulder, running for the hip  INTEGUMENTARY (edema, incisions):   POSTURE: good posture,  slight anterior pelvic tilt  GAIT:   Weightbearing Status:   Assistive Device(s):   Gait Deviations:   BALANCE/PROPRIOCEPTION:   WEIGHTBEARING ALIGNMENT:   NON-WEIGHTBEARING ALIGNMENT:    ROM: no pain with this,     PELVIC/SI SCREEN: Sacroiliac Provocation Test: positive sheer on the left  STRENGTH: WNL  Fatigues more easily with hip abduction  LE FLEXIBILITY: Decreased hip flexors L, Decreased hip flexors R  SPECIAL TESTS:   FUNCTIONAL TESTS: Double Leg Squat: Good technique/no significant findings  PALPATION: left SI and glut medius  JOINT MOBILITY: WFL  SHOULDER EVALUATION  PAIN:   INTEGUMENTARY (edema, incisions):   POSTURE: good  GAIT:   Weightbearing Status:   Assistive Device(s):   Gait Deviations:   BALANCE/PROPRIOCEPTION:   WEIGHTBEARING ALIGNMENT:   ROM: AROM is WNL, painful arc with abduction, end range pain with ER, increased mobility left shoulder greater than right    STRENGTH:   Pain: - none + mild ++ moderate +++ severe  Strength Scale: 0-5/5 Left Right   Shoulder Flexion     Shoulder Extension     Shoulder Abduction 5-, ++ (mod) 5   Shoulder Adduction 5-, + (mild) 5   Shoulder Internal Rotation 5 5   Shoulder External Rotation 5-, ++ (mod) 5   Shoulder Horizontal Abduction     Shoulder Horizontal Adduction     Elbow Flexion     Elbow Extension     Mid Trap     Lower Trap     Rhomboid     Serratus Anterior       FLEXIBILITY: WFL  SPECIAL TESTS:    PALPATION:   JOINT MOBILITY:    Left Right   Glenohumeral anterior Hypermobile    Glenohumeral posterior Hypermobile    Glenohumeral inferior     Acromioclavicular     Scapulothoracic     Sternoclavicular     Scapulohumeral rhythm       CERVICAL SCREEN: WNL    Assessment & Plan   CLINICAL IMPRESSIONS  Medical Diagnosis: Tendinopathy of left rotator cuff (Primary Dx);  Left shoulder pain, unspecified chronicity    Treatment Diagnosis: Tendinopathy of left rotator cuff (Primary Dx);  Left shoulder pain, unspecified chronicity, left hip and SI pain    Impression/Assessment: Patient is a 55 year old female with left shoulder and hip complaints.  The following significant findings have been identified: Pain, Decreased strength, and Decreased activity tolerance. These impairments interfere with their ability to perform self care tasks, recreational activities, and community mobility as compared to previous level of function.     Clinical Decision Making (Complexity):  Clinical Presentation: Stable/Uncomplicated  Clinical Presentation Rationale: based on medical and personal factors listed in PT evaluation  Clinical Decision Making (Complexity): Low complexity    PLAN OF CARE  Treatment Interventions:  Interventions: Neuromuscular Re-education, Therapeutic Activity, Therapeutic Exercise    Long Term Goals     PT Goal 1  Goal Identifier: dressing  Goal Description: able to take off a bra overhead without shoulder pain  Rationale: to maximize safety and independence with performance of ADLs and functional tasks;to maximize safety and independence with self cares  Goal Progress: currently has left shoulder pain with overhead motion  Target Date: 08/10/25  PT Goal 2  Goal Identifier: walking, return to run  Goal Description: able to walk in the community with a backpack without left hip pain  Rationale: to maximize safety and independence within the community  Goal Progress: current has left SI and hip pain with attempting running and with walking 3 miles with #16 in back pack  Target Date: 08/10/25      Frequency of Treatment: 2 times a month  Duration of Treatment: 2 months    Recommended Referrals to Other Professionals:   Education Assessment:   Learner/Method: Patient;Listening;Reading;Demonstration;Pictures/Video;No Barriers to Learning    Risks and benefits of evaluation/treatment have been explained.   Patient/Family/caregiver agrees with Plan of Care.     Evaluation Time:     PT Eval, Low Complexity Minutes (95034): 15       Signing Clinician: Brinda Drake  PT

## 2025-07-28 ENCOUNTER — THERAPY VISIT (OUTPATIENT)
Age: 55
End: 2025-07-28
Attending: PODIATRIST
Payer: COMMERCIAL

## 2025-07-28 DIAGNOSIS — M67.912 TENDINOPATHY OF LEFT ROTATOR CUFF: ICD-10-CM

## 2025-07-28 DIAGNOSIS — M25.552 HIP PAIN, LEFT: Primary | ICD-10-CM

## 2025-07-28 DIAGNOSIS — M25.512 LEFT SHOULDER PAIN, UNSPECIFIED CHRONICITY: ICD-10-CM

## 2025-07-28 PROCEDURE — 97110 THERAPEUTIC EXERCISES: CPT | Mod: GP | Performed by: PHYSICAL THERAPIST

## 2025-07-28 PROCEDURE — 97140 MANUAL THERAPY 1/> REGIONS: CPT | Mod: GP | Performed by: PHYSICAL THERAPIST
